# Patient Record
Sex: MALE | Race: WHITE | NOT HISPANIC OR LATINO | URBAN - METROPOLITAN AREA
[De-identification: names, ages, dates, MRNs, and addresses within clinical notes are randomized per-mention and may not be internally consistent; named-entity substitution may affect disease eponyms.]

---

## 2022-02-08 ENCOUNTER — EMERGENCY (EMERGENCY)
Facility: HOSPITAL | Age: 37
LOS: 1 days | Discharge: HOME | End: 2022-02-08
Attending: EMERGENCY MEDICINE | Admitting: EMERGENCY MEDICINE
Payer: OTHER MISCELLANEOUS

## 2022-02-08 VITALS
OXYGEN SATURATION: 100 % | HEART RATE: 67 BPM | SYSTOLIC BLOOD PRESSURE: 166 MMHG | HEIGHT: 68 IN | WEIGHT: 160.06 LBS | TEMPERATURE: 97 F | DIASTOLIC BLOOD PRESSURE: 98 MMHG | RESPIRATION RATE: 18 BRPM

## 2022-02-08 VITALS
RESPIRATION RATE: 18 BRPM | OXYGEN SATURATION: 100 % | TEMPERATURE: 97 F | SYSTOLIC BLOOD PRESSURE: 150 MMHG | DIASTOLIC BLOOD PRESSURE: 90 MMHG | HEART RATE: 70 BPM

## 2022-02-08 DIAGNOSIS — Z98.890 OTHER SPECIFIED POSTPROCEDURAL STATES: Chronic | ICD-10-CM
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
## 2022-02-08 DIAGNOSIS — S01.111A LACERATION WITHOUT FOREIGN BODY OF RIGHT EYELID AND PERIOCULAR AREA, INITIAL ENCOUNTER: ICD-10-CM
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
## 2022-02-08 DIAGNOSIS — H57.11 OCULAR PAIN, RIGHT EYE: ICD-10-CM

## 2022-02-08 PROCEDURE — 99283 EMERGENCY DEPT VISIT LOW MDM: CPT

## 2022-02-08 RX ORDER — BACITRACIN 500 [USP'U]/G
1 OINTMENT OPHTHALMIC ONCE
Refills: 0 | Status: DISCONTINUED | OUTPATIENT
Start: 2022-02-08 | End: 2022-02-08

## 2022-02-08 RX ORDER — BACITRACIN 500 [USP'U]/G
1 OINTMENT OPHTHALMIC ONCE
Refills: 0 | Status: COMPLETED | OUTPATIENT
Start: 2022-02-08 | End: 2022-02-08

## 2022-02-08 RX ORDER — FLUORESCEIN SODIUM 9 MG
1 STRIP OPHTHALMIC (EYE) ONCE
Refills: 0 | Status: COMPLETED | OUTPATIENT
Start: 2022-02-08 | End: 2022-02-08

## 2022-02-08 RX ADMIN — Medication 1 APPLICATION(S): at 13:16

## 2022-02-08 RX ADMIN — Medication 1 DROP(S): at 13:16
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
## 2022-02-08 NOTE — ED PROVIDER NOTE - NSFOLLOWUPINSTRUCTIONS_ED_ALL_ED_FT
PLEASE FOLLOW-UP WITH AN EYE DOCTOR WITHIN 2 DAYS PRIOR TO RETURNING TO WORK.  PLEASE SEE ABOVE INFORMATION FOR DR. BOYD - EYE SPECIALIST.     PLEASE ICE THE AREA OF PAIN FOR THE NEXT 2 DAYS.  ICE FOR 20 MINUTES AT A TIME AT LEAST 3-4 TIMES A DAY.    PLEASE RETURN TO THE ER IMMEDIATELY OR CALL 911 FOR ANY HIGH FEVER, WORSENING SWELLING, LOSS OF VISION, EYE PAIN, OR ANY OTHER CONCERNS.    BE SURE TO APPLY THE OINTMENT GIVEN TO YOU TODAY EVERY FOUR HOURS TO THE RIGHT EYELIDS.  IT IS OKAY IF IT GETS INTO YOUR EYE.  USE FOR ONE WEEK.

## 2022-02-08 NOTE — ED PROVIDER NOTE - PATIENT PORTAL LINK FT
You can access the FollowMyHealth Patient Portal offered by [facility] by registering at the following website: [email or web address]. By joining [facility]’s FollowMyHealth portal, you will also be able to view your health information using other applications (apps) compatible with our system.

## 2022-02-08 NOTE — ED PROVIDER NOTE - PHYSICAL EXAMINATION
CONSTITUTIONAL: Well appearing, well nourished, awake, alert, oriented to person, place, time/situation and in no apparent distress.  ENT: Airway patent, Nasal mucosa clear. Mouth with normal mucosa.  EYES: +1cm superficial lac/abrasion to R upper eyelid with small underlying ecchymoses.  Ptosis noted to R eye compared to L, pt reports baseline.  PERRL b/l.  EOM intact and do not elicit pain or double vision.  Snellen 20/25 bilaterally.  No cells or flare in anterior chamber.  No hyphema.  No chemosis.  Retina appears fully intact b/l.  Fluorecin stain performed and no lacs (negative Kinards's) and no abrasions.    RESPIRATORY: Breathing comfortably with normal RR.  MSK: Range of motion is not limited, no deformities noted.  NEURO: Alert and oriented, no focal deficits.  SKIN: Skin normal color for race, warm, dry and intact. No evidence of rash.  PSYCH: Alert and oriented to person, place, time/situation. normal mood and affect. no apparent risk to self or others.

## 2022-02-08 NOTE — ED PROVIDER NOTE - CARE PROVIDER_API CALL
Rakan Jarrett  OPHTHALMOLOGY  [address] [address]  Phone: [phone]  Fax: [phone]  Follow Up Time: 1-3 Days

## 2022-02-08 NOTE — ED PROVIDER NOTE - CLINICAL SUMMARY MEDICAL DECISION MAKING FREE TEXT BOX
+Superficial lac/abrasion to R upper eyelid.  R eyeball exam itself appears benign.  Bacitracin ophthalmic applied and tube given to pt with instructions and demonstration of use.  Strict instructions to f/u with an ophthalmologist.  Ice.  Discussed emergent return precautions.